# Patient Record
Sex: FEMALE | Race: WHITE | Employment: OTHER | URBAN - METROPOLITAN AREA
[De-identification: names, ages, dates, MRNs, and addresses within clinical notes are randomized per-mention and may not be internally consistent; named-entity substitution may affect disease eponyms.]

---

## 2017-01-11 ENCOUNTER — OFFICE VISIT (OUTPATIENT)
Dept: INTERNAL MEDICINE CLINIC | Age: 82
End: 2017-01-11

## 2017-01-11 VITALS
TEMPERATURE: 98 F | BODY MASS INDEX: 19.14 KG/M2 | WEIGHT: 104 LBS | HEIGHT: 62 IN | RESPIRATION RATE: 16 BRPM | SYSTOLIC BLOOD PRESSURE: 130 MMHG | DIASTOLIC BLOOD PRESSURE: 84 MMHG

## 2017-01-11 DIAGNOSIS — G30.9 ALZHEIMER'S DEMENTIA WITHOUT BEHAVIORAL DISTURBANCE, UNSPECIFIED TIMING OF DEMENTIA ONSET: Primary | ICD-10-CM

## 2017-01-11 DIAGNOSIS — I67.2 CEREBRAL ATHEROSCLEROSIS: ICD-10-CM

## 2017-01-11 DIAGNOSIS — R62.7 FAILURE TO THRIVE IN ADULT: ICD-10-CM

## 2017-01-11 DIAGNOSIS — F02.80 ALZHEIMER'S DEMENTIA WITHOUT BEHAVIORAL DISTURBANCE, UNSPECIFIED TIMING OF DEMENTIA ONSET: Primary | ICD-10-CM

## 2017-01-11 RX ORDER — MEMANTINE HYDROCHLORIDE 5 MG/1
5 TABLET ORAL DAILY
Qty: 30 TAB | Refills: 1 | Status: SHIPPED | OUTPATIENT
Start: 2017-01-11 | End: 2017-08-09

## 2017-01-11 NOTE — MR AVS SNAPSHOT
Visit Information Date & Time Provider Department Dept. Phone Encounter #  
 1/11/2017 11:45 AM Renée Mensah MD [facility] [address] [id number] Follow-up Instructions Return in about 1 month (around 2/11/2017). Upcoming Health Maintenance Date Due DTaP/Tdap/Td series (1 - Tdap) 1/29/1950 ZOSTER VACCINE AGE 60> 1/29/1989 GLAUCOMA SCREENING Q2Y 1/29/1994 OSTEOPOROSIS SCREENING (DEXA) 1/29/1994 Pneumococcal 65+ Low/Medium Risk (1 of 2 - PCV13) 1/29/1994 MEDICARE YEARLY EXAM 1/29/1994 INFLUENZA AGE 9 TO ADULT 8/1/2016 Allergies as of 1/11/2017  Review Complete On: 1/11/2017 By: Boone Gleason LPN Severity Noted Reaction Type Reactions Aspirin  05/16/2011    Unknown (comments) Darvon [Propoxyphene]  05/16/2011    Unknown (comments) Demerol [Meperidine]  05/16/2011    Unknown (comments) Keflex [Cephalexin]  05/16/2011    Unknown (comments) Pcn [Penicillins]  05/16/2011    Unknown (comments) Current Immunizations  Never Reviewed No immunizations on file. Not reviewed this visit You Were Diagnosed With   
  
 Codes Comments Alzheimer's dementia without behavioral disturbance, unspecified timing of dementia onset    -  Primary ICD-10-CM: G30.9, F02.80 ICD-9-CM: 331.0, 294.10 Failure to thrive in adult     ICD-10-CM: R62.7 ICD-9-CM: 579. 7 Vitals BP Temp Resp Height(growth percentile) Weight(growth percentile) BMI  
 130/84 (BP 1 Location: Left arm, BP Patient Position: Sitting) 98 °F (36.7 °C) (Oral) 16 5' 2\" (1.575 m) 104 lb (47.2 kg) 19.02 kg/m2 OB Status Smoking Status Postmenopausal Never Smoker Vitals History BMI and BSA Data Body Mass Index Body Surface Area 19.02 kg/m 2 1.44 m 2 Preferred Pharmacy Pharmacy Name Phone  [address] [facility] AT [facility] [phone] Your Updated Medication List  
  
   
This list is accurate as of: 1/11/17  1:11 PM.  Always use your most recent med list.  
  
  
  
  
 memantine 5 mg tablet Commonly known as:  Sarah Peppers Take 1 Tab by mouth daily. Prescriptions Sent to Pharmacy Refills  
 memantine (NAMENDA) 5 mg tablet 1 Sig: Take 1 Tab by mouth daily. Class: Normal  
 [facility]acy: [facility] [address] [facility] #: [phone] Route: Oral  
  
Follow-up Instructions Return in about 1 month (around 2/11/2017). Introducing [facility]! Dear Shahab Wells: Thank you for requesting a Innovid account. Our records indicate that you already have an active Innovid account. You can access your account anytime at [email or web address] Did you know that you can access your hospital and ER discharge instructions at any time in Innovid? You can also review all of your test results from your hospital stay or ER visit. Additional Information If you have questions, please visit the Frequently Asked Questions section of the Innovid website at [email or web address] Remember, Innovid is NOT to be used for urgent needs. For medical emergencies, dial 911. Now available from your iPhone and Android! Please provide this summary of care documentation to your next provider. Your primary care clinician is listed as Armando Durham. If you have any questions after today's visit, please call [phone].

## 2017-01-11 NOTE — PROGRESS NOTES
Monserrat Rosenthal is a 80 y.o. female who presents today for Dementia  . She has a history of   Patient Active Problem List   Diagnosis Code    Disturbance of skin sensation R20.9    Cerebral atherosclerosis I67.2    Memory loss R41.3    Alzheimer's disease G30.9    Hyperlipidemia LDL goal < 100 E78.5   . Today patient is here for follow up. she does have other concerns. Wants to know who will be driving to help them out. Dementia: Lab work negative. Pt not taking any meds. No concerns for her, but when sister asks about her memory and if anything can be taken, she does note wanting something. Notes that she has been on Namenda in the past.     Failure to Thrive: weight stable. Notes no concerns. ROS  Review of Systems   Constitutional: Negative for chills, fever and weight loss. HENT: Negative for congestion and sore throat. Eyes: Negative for blurred vision, double vision, photophobia, pain and discharge. Respiratory: Negative for cough, hemoptysis, sputum production and shortness of breath. Cardiovascular: Negative for chest pain, palpitations, orthopnea, claudication and leg swelling. Gastrointestinal: Negative for abdominal pain, constipation, diarrhea, heartburn, nausea and vomiting. Genitourinary: Negative for dysuria, frequency, hematuria and urgency. Musculoskeletal: Negative for back pain, joint pain, myalgias and neck pain. Skin: Negative for rash. Neurological: Negative. Negative for headaches. Endo/Heme/Allergies: Does not bruise/bleed easily. Psychiatric/Behavioral: Positive for memory loss. Negative for depression, hallucinations, substance abuse and suicidal ideas. The patient is not nervous/anxious and does not have insomnia.         Visit Vitals    /84 (BP 1 Location: Left arm, BP Patient Position: Sitting)    Temp 98 °F (36.7 °C) (Oral)    Resp 16    Ht 5' 2\" (1.575 m)    Wt 104 lb (47.2 kg)    BMI 19.02 kg/m2       Physical Exam Constitutional:   Very Thin   HENT:   Head: Normocephalic and atraumatic. Cardiovascular: Normal rate, regular rhythm and normal heart sounds. Pulmonary/Chest: Breath sounds normal. No respiratory distress. She has no wheezes. Abdominal: Soft. Bowel sounds are normal.   Musculoskeletal: Normal range of motion. She exhibits no edema. Neurological: She is alert. Confused. Able to describe location, not date time or city. Skin: Skin is dry. Psychiatric:   Angry at times. Current Outpatient Prescriptions   Medication Sig    memantine (NAMENDA) 5 mg tablet Take 1 Tab by mouth daily. No current facility-administered medications for this visit. Past Medical History   Diagnosis Date    Alzheimer's dementia     Stroke [facility]) 2007      History reviewed. No pertinent past surgical history. Social History   Substance Use Topics    Smoking status: Never Smoker    Smokeless tobacco: Never Used    Alcohol use No      History reviewed. No pertinent family history. Allergies   Allergen Reactions    Aspirin Unknown (comments)    Darvon [Propoxyphene] Unknown (comments)    Demerol [Meperidine] Unknown (comments)    Keflex [Cephalexin] Unknown (comments)    Pcn [Penicillins] Unknown (comments)        Assessment/Plan  Rosario Garland was seen today for dementia. Diagnoses and all orders for this visit:    Alzheimer's dementia without behavioral disturbance, unspecified timing of dementia onset - Ok with restarting low dose namenda. If she continues to follow up, could send her back to neuro. -     memantine (NAMENDA) 5 mg tablet; Take 1 Tab by mouth daily. Failure to thrive in adult - weight stable. Have not yet heard from home health. Will need to reach out to them as to what type of help can be provided. This along with her memory issues are my greatest concerns.    Over 50% of a visit of 25 minutes spent reviewing diagnosis and treatment options and side effects of medicines. History of CVA - on plavix in the past. Pt currently not taking any blood thinners. Suggest holding off at this time due to dementia. Follow-up Disposition:  Return in about 1 month (around 2/11/2017).     Glenna Leal MD  1/11/2017

## 2017-01-12 ENCOUNTER — TELEPHONE (OUTPATIENT)
Dept: INTERNAL MEDICINE CLINIC | Age: 82
End: 2017-01-12

## 2017-08-09 ENCOUNTER — OFFICE VISIT (OUTPATIENT)
Dept: NEUROLOGY | Age: 82
End: 2017-08-09

## 2017-08-09 VITALS
BODY MASS INDEX: 18.75 KG/M2 | HEIGHT: 62 IN | DIASTOLIC BLOOD PRESSURE: 80 MMHG | RESPIRATION RATE: 18 BRPM | OXYGEN SATURATION: 96 % | HEART RATE: 78 BPM | SYSTOLIC BLOOD PRESSURE: 120 MMHG | TEMPERATURE: 98 F | WEIGHT: 101.9 LBS

## 2017-08-09 DIAGNOSIS — F02.80 LATE ONSET ALZHEIMER'S DISEASE WITHOUT BEHAVIORAL DISTURBANCE (HCC): Primary | ICD-10-CM

## 2017-08-09 DIAGNOSIS — G30.1 LATE ONSET ALZHEIMER'S DISEASE WITHOUT BEHAVIORAL DISTURBANCE (HCC): Primary | ICD-10-CM

## 2017-08-09 RX ORDER — DONEPEZIL HYDROCHLORIDE 10 MG/1
10 TABLET, FILM COATED ORAL
Qty: 30 TAB | Refills: 5 | Status: SHIPPED | OUTPATIENT
Start: 2017-09-09

## 2017-08-09 RX ORDER — DONEPEZIL HYDROCHLORIDE 5 MG/1
TABLET, FILM COATED ORAL
Qty: 30 TAB | Refills: 0 | Status: SHIPPED | OUTPATIENT
Start: 2017-08-09

## 2017-08-09 NOTE — PATIENT INSTRUCTIONS
Information Regarding Testing     If you have physican order for a test or a medication denied by your insurance company, this does not mean the test or medication is not appropriate for you as that is a medical decision, not a decision to be made by an insurance company representative or by an [facility] physician who has not interviewed and examined you. This is a decision to be made between you and your physician. The denial of services is a contractual matter between you and your insurance company, not an issue between your physician and the insurance company. If your test or medication is denied, you can take the following steps to help resolve the issue:    1. File a complaint with the [facility] regarding your insurance company's denial of services ordered for you. You can do this either by calling them directly or by completing an on-line complaint form on the [employer]. This can be found at [email or web address]    2. Also file a formal complaint with your insurance company and ask to have the name of the person denying the service so that you may explore a legal option should you be harmed by this denial of service. Again, the fact the insurance company will not pay for the service does not mean it is not medically necessary and I would encourage you to follow through with the plan that was made with your physician    3. File a written complaint with your employer so your employer and benefit manager is aware of the poor coverage they are providing their employees. If you have medicare/medicaid, complain to your representative in the House and to your Liam Vazquez.     [facility]   Statement to Patients  April 1, 2014      In an effort to ensure the large volume of patient prescription refills is processed in the most efficient and expeditious manner, we are asking our patients to assist us by calling your Pharmacy for all prescription refills, this will include also your  Mail Order Pharmacy. The pharmacy will contact our office electronically to continue the refill process. Please do not wait until the last minute to call your pharmacy. We need at least 48 hours (2days) to fill prescriptions. We also encourage you to call your pharmacy before going to  your prescription to make sure it is ready. With regard to controlled substance prescription refill requests (narcotic refills) that need to be picked up at our office, we ask your cooperation by providing us with at least 72 hours (3days) notice that you will need a refill. We will not refill narcotic prescription refill requests after 4:00pm on any weekday, Monday through Thursday, or after 2:00pm on Fridays, or on the weekends. We encourage everyone to explore another way of getting your prescription refill request processed using Blendspace, our patient web portal through our electronic medical record system. Blendspace is an efficient and effective way to communicate your medication request directly to the office and  downloadable as an jacob on your smart phone . Blendspace also features a review functionality that allows you to view your medication list as well as leave messages for your physician. Are you ready to get connected? If so please review the attatched instructions or speak to any of our staff to get you set up right away! Thank you so much for your cooperation. Should you have any questions please contact our Practice Administrator. The Physicians and Staff,  [facility]     If we have ordered testing for you, we do not call patients with results and we do not give test results over the phone. We schedule follow up appointments so that your results can be discussed in person and any questions you have regarding them may be addressed.   If something of concern is revealed on your test, we will call you for a sooner follow up appointment. Additionally, results may be found by using the My Chart feature and one of our patient service representatives at the  can give you instructions on how to access this feature of our electronic medical record system. Learning About Living [place]  What is a living will? A living will is a legal form you use to write down the kind of care you want at the end of your life. It is used by the health professionals who will treat you if you aren't able to decide for yourself. If you put your wishes in writing, your loved ones and others will know what kind of care you want. They won't need to guess. This can ease your mind and be helpful to others. A living will is not the same as an estate or property will. An estate will explains what you want to happen with your money and property after you die. Is a living will a legal document? A living will is a legal document. Each state has its own laws about living [place]. If you move to another state, make sure that your living will is legal in the state where you now live. Or you might use a universal form that has been approved by many states. This kind of form can sometimes be completed and stored online. Your electronic copy will then be available wherever you have a connection to the Internet. In most cases, doctors will respect your wishes even if you have a form from a different state. · You don't need an  to complete a living will. But legal advice can be helpful if your state's laws are unclear, your health history is complicated, or your family can't agree on what should be in your living will. · You can change your living will at any time. Some people find that their wishes about end-of-life care change as their health changes. · In addition to making a living will, think about completing a medical power of  form.  This form lets you name the person you want to make end-of-life treatment decisions for you (your \"health care agent\") if you're not able to. Many hospitals and nursing homes will give you the forms you need to complete a living will and a medical power of . · Your living will is used only if you can't make or communicate decisions for yourself anymore. If you become able to make decisions again, you can accept or refuse any treatment, no matter what you wrote in your living will. · Your state may offer an online registry. This is a place where you can store your living will online so the doctors and nurses who need to treat you can find it right away. What should you think about when creating a living will? Talk about your end-of-life wishes with your family members and your doctor. Let them know what you want. That way the people making decisions for you won't be surprised by your choices. Think about these questions as you make your living will:  · Do you know enough about life support methods that might be used? If not, talk to your doctor so you know what might be done if you can't breathe on your own, your heart stops, or you're unable to swallow. · What things would you still want to be able to do after you receive life-support methods? Would you want to be able to walk? To speak? To eat on your own? To live without the help of machines? · If you have a choice, where do you want to be cared for? In your home? At a hospital or nursing home? · Do you want certain Spiritism practices performed if you become very ill? · If you have a choice at the end of your life, where would you prefer to die? At home? In a hospital or nursing home? Somewhere else? · Would you prefer to be buried or cremated? · Do you want your organs to be donated after you die? What should you do with your living will? · Make sure that your family members and your health care agent have copies of your living will. · Give your doctor a copy of your living will to keep in your medical record.  If you have more than one doctor, make sure that each one has a copy. · You may want to put a copy of your living will where it can be easily found. Where can you learn more? Go to http://ana-chase.info/. Enter Z176 in the search box to learn more about \"Learning About Living [place]. \"  Current as of: August 8, 2016  Content Version: 11.3  © 1950-2507 [employer]. Care instructions adapted under license by [employer] (which disclaims liability or warranty for this information). If you have questions about a medical condition or this instruction, always ask your healthcare professional. [address] any warranty or liability for your use of this information.

## 2017-08-09 NOTE — MR AVS SNAPSHOT
Visit Information Date & Time Provider Department Dept. Phone Encounter #  
 8/9/2017 11:40 AM Gina Carrillo MD [facility] [phone] [id number] Follow-up Instructions Return in about 2 months (around 10/9/2017). Upcoming Health Maintenance Date Due DTaP/Tdap/Td series (1 - Tdap) 1/29/1950 ZOSTER VACCINE AGE 60> 11/29/1988 GLAUCOMA SCREENING Q2Y 1/29/1994 OSTEOPOROSIS SCREENING (DEXA) 1/29/1994 Pneumococcal 65+ Low/Medium Risk (1 of 2 - PCV13) 1/29/1994 MEDICARE YEARLY EXAM 1/29/1994 INFLUENZA AGE 9 TO ADULT 8/1/2017 Allergies as of 8/9/2017  Review Complete On: 8/9/2017 By: Lanre Locke LPN Severity Noted Reaction Type Reactions Aspirin  05/16/2011    Unknown (comments) Darvon [Propoxyphene]  05/16/2011    Unknown (comments) Demerol [Meperidine]  05/16/2011    Unknown (comments) Keflex [Cephalexin]  05/16/2011    Unknown (comments) Pcn [Penicillins]  05/16/2011    Unknown (comments) Current Immunizations  Never Reviewed No immunizations on file. Not reviewed this visit Vitals BP Pulse Temp Resp Height(growth percentile) Weight(growth percentile) 120/80 78 98 °F (36.7 °C) 18 5' 2\" (1.575 m) 101 lb 14.4 oz (46.2 kg) SpO2 BMI OB Status Smoking Status 96% 18.64 kg/m2 Postmenopausal Never Smoker BMI and BSA Data Body Mass Index Body Surface Area  
 18.64 kg/m 2 1.42 m 2 Preferred Pharmacy Pharmacy Name Phone [facility] [place], [address] [place] AT [facility] [phone] Your Updated Medication List  
  
   
This list is accurate as of: 8/9/17 11:59 AM.  Always use your most recent med list.  
  
  
  
  
 * donepezil 5 mg tablet Commonly known as:  ARICEPT  
1 po every day x 30 days then start 10 mg tabs * donepezil 10 mg tablet Commonly known as:  ARICEPT  
 Take 1 Tab by mouth nightly. Start taking on:  2017 * Notice: This list has 2 medication(s) that are the same as other medications prescribed for you. Read the directions carefully, and ask your doctor or other care provider to review them with you. Prescriptions Sent to Pharmacy Refills  
 donepezil (ARICEPT) 5 mg tablet 0 Si po every day x 30 days then start 10 mg tabs Class: Normal  
 Pharmacy: [facility] [address] Ph #: [phone]  
 donepezil (ARICEPT) 10 mg tablet 5 Starting on: 2017 Sig: Take 1 Tab by mouth nightly. Class: Normal  
 Pharmacy: [facility] [address] [facility] Ph #: [phone] Route: Oral  
  
Follow-up Instructions Return in about 2 months (around 10/9/2017). Patient Instructions Information Regarding Testing If you have physican order for a test or a medication denied by your insurance company, this does not mean the test or medication is not appropriate for you as that is a medical decision, not a decision to be made by an insurance company representative or by an [facility] physician who has not interviewed and examined you. This is a decision to be made between you and your physician. The denial of services is a contractual matter between you and your insurance company, not an issue between your physician and the insurance company. If your test or medication is denied, you can take the following steps to help resolve the issue: 1. File a complaint with the [facility] of Insurance regarding your insurance company's denial of services ordered for you. You can do this either by calling them directly or by completing an on-line complaint form on the [employer].   This can be found at www.[place].gov 
 
 2.   Also file a formal complaint with your insurance company and ask to have the name of the person denying the service so that you may explore a legal option should you be harmed by this denial of service. Again, the fact the insurance company will not pay for the service does not mean it is not medically necessary and I would encourage you to follow through with the plan that was made with your physician 3. File a written complaint with your employer so your employer and benefit manager is aware of the poor coverage they are providing their employees. If you have medicare/medicaid, complain to your representative in the House and to your Liam Vazquez. PRESCRIPTION REFILL POLICY [facility] Statement to Patients April 1, 2014 In an effort to ensure the large volume of patient prescription refills is processed in the most efficient and expeditious manner, we are asking our patients to assist us by calling your Pharmacy for all prescription refills, this will include also your  Mail Order Pharmacy. The pharmacy will contact our office electronically to continue the refill process. Please do not wait until the last minute to call your pharmacy. We need at least 48 hours (2days) to fill prescriptions. We also encourage you to call your pharmacy before going to  your prescription to make sure it is ready. With regard to controlled substance prescription refill requests (narcotic refills) that need to be picked up at our office, we ask your cooperation by providing us with at least 72 hours (3days) notice that you will need a refill. We will not refill narcotic prescription refill requests after 4:00pm on any weekday, Monday through Thursday, or after 2:00pm on Fridays, or on the weekends.   
  
We encourage everyone to explore another way of getting your prescription refill request processed using [employer], our patient web portal through our electronic medical record system. [employer] is an efficient and effective way to communicate your medication request directly to the office and  downloadable as an jacob on your smart phone . [employer] also features a review functionality that allows you to view your medication list as well as leave messages for your physician. Are you ready to get connected? If so please review the attatched instructions or speak to any of our staff to get you set up right away! Thank you so much for your cooperation. Should you have any questions please contact our Practice Administrator. The Physicians and Staff,  [facility] If we have ordered testing for you, we do not call patients with results and we do not give test results over the phone. We schedule follow up appointments so that your results can be discussed in person and any questions you have regarding them may be addressed. If something of concern is revealed on your test, we will call you for a sooner follow up appointment. Additionally, results may be found by using the My Chart feature and one of our patient service representatives at the  can give you instructions on how to access this feature of our electronic medical record system. Elmer Chaudhary [address] What is a living will? A living will is a legal form you use to write down the kind of care you want at the end of your life. It is used by the health professionals who will treat you if you aren't able to decide for yourself. If you put your wishes in writing, your loved ones and others will know what kind of care you want. They won't need to guess. This can ease your mind and be helpful to others. A living will is not the same as an estate or property will. An estate will explains what you want to happen with your money and property after you die. Is a living will a legal document? A living will is a legal document.  Each state has its own laws about living lópez. If you move to another state, make sure that your living will is legal in the state where you now live. Or you might use a universal form that has been approved by many states. This kind of form can sometimes be completed and stored online. Your electronic copy will then be available wherever you have a connection to the Internet. In most cases, doctors will respect your wishes even if you have a form from a different state. · You don't need an  to complete a living will. But legal advice can be helpful if your state's laws are unclear, your health history is complicated, or your family can't agree on what should be in your living will. · You can change your living will at any time. Some people find that their wishes about end-of-life care change as their health changes. · In addition to making a living will, think about completing a medical power of  form. This form lets you name the person you want to make end-of-life treatment decisions for you (your \"health care agent\") if you're not able to. Many hospitals and nursing homes will give you the forms you need to complete a living will and a medical power of . · Your living will is used only if you can't make or communicate decisions for yourself anymore. If you become able to make decisions again, you can accept or refuse any treatment, no matter what you wrote in your living will. · Your state may offer an online registry. This is a place where you can store your living will online so the doctors and nurses who need to treat you can find it right away. What should you think about when creating a living will? Talk about your end-of-life wishes with your family members and your doctor. Let them know what you want. That way the people making decisions for you won't be surprised by your choices. Think about these questions as you make your living will: · Do you know enough about life support methods that might be used? If not, talk to your doctor so you know what might be done if you can't breathe on your own, your heart stops, or you're unable to swallow. · What things would you still want to be able to do after you receive life-support methods? Would you want to be able to walk? To speak? To eat on your own? To live without the help of machines? · If you have a choice, where do you want to be cared for? In your home? At a hospital or nursing home? · Do you want certain Yarsanism practices performed if you become very ill? · If you have a choice at the end of your life, where would you prefer to die? At home? In a hospital or nursing home? Somewhere else? · Would you prefer to be buried or cremated? · Do you want your organs to be donated after you die? What should you do with your living will? · Make sure that your family members and your health care agent have copies of your living will. · Give your doctor a copy of your living will to keep in your medical record. If you have more than one doctor, make sure that each one has a copy. · You may want to put a copy of your living will where it can be easily found. Where can you learn more? Go to http://ana-chase.info/. Enter L533 in the search box to learn more about \"Learning About Living Perromckay. \" Current as of: August 8, 2016 Content Version: 11.3 © 6124-0358 [employer]. Care instructions adapted under license by [employer] (which disclaims liability or warranty for this information). If you have questions about a medical condition or this instruction, always ask your healthcare professional. [address] any warranty or liability for your use of this information. Introducing [facility] & HEALTH SERVICES! Dear Ryanne Mayo: Thank you for requesting a [employer] account.   Our records indicate that you already have an active [employer] account. You can access your account anytime at [email or web address] Did you know that you can access your hospital and ER discharge instructions at any time in [employer]? You can also review all of your test results from your hospital stay or ER visit. Additional Information If you have questions, please visit the Frequently Asked Questions section of the [employer] website at [email or web address] Remember, [employer] is NOT to be used for urgent needs. For medical emergencies, dial 911. Now available from your iPhone and Android! Please provide this summary of care documentation to your next provider. Your primary care clinician is listed as [facility] [place]. If you have any questions after today's visit, please call [phone].

## 2017-08-09 NOTE — PROGRESS NOTES
[address] [facility]   [place] [address]   [address]   [phone] [facility]   [phone] Fax               Chief Complaint   Patient presents with    Memory Loss     follow up     Current Outpatient Prescriptions   Medication Sig Dispense Refill    memantine (NAMENDA) 5 mg tablet Take 1 Tab by mouth daily. 30 Tab 1      Allergies   Allergen Reactions    Aspirin Unknown (comments)    Darvon [Propoxyphene] Unknown (comments)    Demerol [Meperidine] Unknown (comments)    Keflex [Cephalexin] Unknown (comments)    Pcn [Penicillins] Unknown (comments)     Social History   Substance Use Topics    Smoking status: Never Smoker    Smokeless tobacco: Never Used    Alcohol use No   Patient returns today for follow-up dementia, Alzheimer's type. When last seen in 2015 she was on a combination of Namenda and Aricept. She returns today with a family member. She is not on any medicine for dementia. Her  passed about 6 weeks ago. She does not recall being on any medication for her dementia. In fact she does not recall having a diagnosis of dementia. Her family is here today to try to get her reestablished with care. She is living in her own home. Family and friends provides assistance. We discussed supervision for medication. We discussed meal preparation etc.  My understanding from the person who accompanies her today is that that is being undertaken. The patient does not understand why she needs supervision. She has not had any dangerous behaviors. No wandering. No aggression. ROS  Unreliable due to her dementia    Examination  Visit Vitals    /80    Pulse 78    Temp 98 °F (36.7 °C)    Resp 18    Ht 5' 2\" (1.575 m)    Wt 46.2 kg (101 lb 14.4 oz)    SpO2 96%    BMI 18.64 kg/m2     She is appropriately dressed and groomed. She is friendly and outgoing.   She is able to pick the appropriate month out of the list.  She cannot tell me the year. She cannot tell me the president even with hints. She follows two-step commands. She cannot answer any other orientation questions. Impression/Plan  Advanced dementia likely Alzheimer's type. Discussed safety issues regarding supervision and concerns I have about her living alone. We will reinstitute Joel in a customary fashion. Follow in 8 weeks and institute Aricept as well. She again is going to require significant supervision I feel given her presentation today and will need to have further discussions with her family in that regard. Made that quite clear today. Total time: 30 min   Counseling / coordination time: 20 min   > 50% counseling / coordination?: Yes re: as documented above      This note was created using voice recognition software. Despite editing, there may be syntax errors. This note will not be viewable in [address]

## 2017-08-09 NOTE — PROGRESS NOTES
Follow up for memory loss. No significant changes in memory since last visit. No acute problems reported. Spouse passed about 6 weeks ago.

## 2023-05-18 RX ORDER — DONEPEZIL HYDROCHLORIDE 10 MG/1
10 TABLET, FILM COATED ORAL
COMMUNITY
Start: 2017-09-09

## 2023-05-18 RX ORDER — DONEPEZIL HYDROCHLORIDE 5 MG/1
TABLET, FILM COATED ORAL
COMMUNITY
Start: 2017-08-09